# Patient Record
Sex: FEMALE | Race: WHITE | NOT HISPANIC OR LATINO | URBAN - METROPOLITAN AREA
[De-identification: names, ages, dates, MRNs, and addresses within clinical notes are randomized per-mention and may not be internally consistent; named-entity substitution may affect disease eponyms.]

---

## 2017-09-26 ENCOUNTER — APPOINTMENT (RX ONLY)
Dept: URBAN - METROPOLITAN AREA CLINIC 23 | Facility: CLINIC | Age: 22
Setting detail: DERMATOLOGY
End: 2017-09-26

## 2017-09-26 DIAGNOSIS — D22 MELANOCYTIC NEVI: ICD-10-CM

## 2017-09-26 DIAGNOSIS — L70.0 ACNE VULGARIS: ICD-10-CM

## 2017-09-26 PROBLEM — D22.62 MELANOCYTIC NEVI OF LEFT UPPER LIMB, INCLUDING SHOULDER: Status: ACTIVE | Noted: 2017-09-26

## 2017-09-26 PROBLEM — J30.1 ALLERGIC RHINITIS DUE TO POLLEN: Status: ACTIVE | Noted: 2017-09-26

## 2017-09-26 PROCEDURE — 99202 OFFICE O/P NEW SF 15 MIN: CPT

## 2017-09-26 PROCEDURE — ? OBSERVATION AND MEASURE

## 2017-09-26 PROCEDURE — ? PRESCRIPTION

## 2017-09-26 PROCEDURE — ? COUNSELING

## 2017-09-26 PROCEDURE — ? TREATMENT REGIMEN

## 2017-09-26 RX ORDER — DOXYCYCLINE 75 MG/1
CAPSULE ORAL
Qty: 30 | Refills: 2 | Status: ERX | COMMUNITY
Start: 2017-09-26

## 2017-09-26 RX ORDER — ADAPALENE 3 MG/G
GEL TOPICAL
Qty: 1 | Refills: 2 | Status: ERX | COMMUNITY
Start: 2017-09-26

## 2017-09-26 RX ADMIN — ADAPALENE: 3 GEL TOPICAL at 15:14

## 2017-09-26 RX ADMIN — DOXYCYCLINE: 75 CAPSULE ORAL at 15:11

## 2017-09-26 ASSESSMENT — LOCATION DETAILED DESCRIPTION DERM
LOCATION DETAILED: LEFT ANTERIOR AXILLA
LOCATION DETAILED: LEFT INFERIOR CENTRAL MALAR CHEEK
LOCATION DETAILED: RIGHT CENTRAL MALAR CHEEK

## 2017-09-26 ASSESSMENT — LOCATION SIMPLE DESCRIPTION DERM
LOCATION SIMPLE: LEFT ANTERIOR AXILLA
LOCATION SIMPLE: RIGHT CHEEK
LOCATION SIMPLE: LEFT CHEEK

## 2017-09-26 ASSESSMENT — LOCATION ZONE DERM
LOCATION ZONE: AXILLAE
LOCATION ZONE: FACE

## 2017-09-26 NOTE — PROCEDURE: TREATMENT REGIMEN
Samples Given: [employer] coupon
Detail Level: Zone
Otc Regimen: Wash with CeraVe and moisturize with CeraVe lotion

## 2023-04-20 ENCOUNTER — HOSPITAL ENCOUNTER (OUTPATIENT)
Dept: DATA CONVERSION | Facility: HOSPITAL | Age: 28
Discharge: HOME | End: 2023-04-20
Attending: EMERGENCY MEDICINE

## 2023-04-20 DIAGNOSIS — Z87.442 PERSONAL HISTORY OF URINARY CALCULI: ICD-10-CM

## 2023-04-20 DIAGNOSIS — R10.9 UNSPECIFIED ABDOMINAL PAIN: Primary | ICD-10-CM

## 2023-04-20 DIAGNOSIS — Z88.2 ALLERGY STATUS TO SULFONAMIDES: ICD-10-CM

## 2023-04-20 LAB
ALBUMIN SERPL-MCNC: 4.8 GM/DL (ref 3.5–5)
ALBUMIN/GLOB SERPL: 1.6 RATIO (ref 1.5–3)
ALP BLD-CCNC: 66 U/L (ref 35–125)
ALT SERPL-CCNC: 21 U/L (ref 5–40)
ANION GAP SERPL CALCULATED.3IONS-SCNC: 11 MMOL/L (ref 0–19)
AST SERPL-CCNC: 19 U/L (ref 5–40)
BACTERIA UR QL AUTO: NEGATIVE
BASOPHILS # BLD AUTO: 0.07 K/UL (ref 0–0.22)
BASOPHILS NFR BLD AUTO: 0.4 % (ref 0–1)
BILIRUB SERPL-MCNC: 0.7 MG/DL (ref 0.1–1.2)
BILIRUB UR QL STRIP.AUTO: NEGATIVE
BUN SERPL-MCNC: 15 MG/DL (ref 8–25)
BUN/CREAT SERPL: 16.7 RATIO (ref 8–21)
CALCIUM SERPL-MCNC: 9.7 MG/DL (ref 8.5–10.4)
CHLORIDE SERPL-SCNC: 96 MMOL/L (ref 97–107)
CLARITY UR: ABNORMAL
CO2 SERPL-SCNC: 23 MMOL/L (ref 24–31)
COLOR UR: YELLOW
CREAT SERPL-MCNC: 0.9 MG/DL (ref 0.4–1.6)
DEPRECATED RDW RBC AUTO: 38.6 FL (ref 37–54)
DIFFERENTIAL METHOD BLD: ABNORMAL
EOSINOPHIL # BLD AUTO: 0.11 K/UL (ref 0–0.45)
EOSINOPHIL NFR BLD: 0.6 % (ref 0–3)
ERYTHROCYTE [DISTWIDTH] IN BLOOD BY AUTOMATED COUNT: 12.5 % (ref 11.7–15)
GFR SERPL CREATININE-BSD FRML MDRD: 89 ML/MIN/1.73 M2
GLOBULIN SER-MCNC: 3 G/DL (ref 1.9–3.7)
GLUCOSE SERPL-MCNC: 127 MG/DL (ref 65–99)
GLUCOSE UR STRIP.AUTO-MCNC: NEGATIVE MG/DL
HCG UR QL: NEGATIVE
HCT VFR BLD AUTO: 41.5 % (ref 36–44)
HGB BLD-MCNC: 14.3 GM/DL (ref 12–15)
HGB UR QL STRIP.AUTO: 1445 /HPF (ref 0–3)
HGB UR QL: ABNORMAL
HYALINE CASTS UR QL AUTO: 6 /LPF
IMM GRANULOCYTES # BLD AUTO: 0.18 K/UL (ref 0–0.1)
KETONES UR QL STRIP.AUTO: ABNORMAL
LEUKOCYTE ESTERASE UR QL STRIP.AUTO: NEGATIVE
LIPASE SERPL-CCNC: 19 U/L (ref 16–63)
LYMPHOCYTES # BLD AUTO: 1.66 K/UL (ref 1.2–3.2)
LYMPHOCYTES NFR BLD MANUAL: 9 % (ref 20–40)
MCH RBC QN AUTO: 29.4 PG (ref 26–34)
MCHC RBC AUTO-ENTMCNC: 34.5 % (ref 31–37)
MCV RBC AUTO: 85.2 FL (ref 80–100)
MICROSCOPIC (UA): ABNORMAL
MONOCYTES # BLD AUTO: 0.52 K/UL (ref 0–0.8)
MONOCYTES NFR BLD MANUAL: 2.8 % (ref 0–8)
NEUTROPHILS # BLD AUTO: 15.97 K/UL
NEUTROPHILS # BLD AUTO: 15.97 K/UL (ref 1.8–7.7)
NEUTROPHILS.IMMATURE NFR BLD: 1 % (ref 0–1)
NEUTS SEG NFR BLD: 86.2 % (ref 50–70)
NITRITE UR QL STRIP.AUTO: NEGATIVE
NRBC BLD-RTO: 0 /100 WBC
PH UR STRIP.AUTO: 6 [PH] (ref 4.6–8)
PLATELET # BLD AUTO: 304 K/UL (ref 150–450)
PMV BLD AUTO: 8.5 CU (ref 7–12.6)
POTASSIUM SERPL-SCNC: 3.9 MMOL/L (ref 3.4–5.1)
PROT SERPL-MCNC: 7.8 G/DL (ref 5.9–7.9)
PROT UR STRIP.AUTO-MCNC: 100 MG/DL
RBC # BLD AUTO: 4.87 M/UL (ref 4–4.9)
SODIUM SERPL-SCNC: 130 MMOL/L (ref 133–145)
SP GR UR STRIP.AUTO: 1.03 (ref 1–1.03)
SQUAMOUS UR QL AUTO: ABNORMAL /HPF
URINE CULTURE: ABNORMAL
UROBILINOGEN UR QL STRIP.AUTO: 2 MG/DL (ref 0–1)
WBC # BLD AUTO: 18.5 K/UL (ref 4.5–11)
WBC #/AREA URNS AUTO: 5 /HPF (ref 0–3)

## 2024-02-20 ENCOUNTER — OFFICE VISIT (OUTPATIENT)
Dept: PRIMARY CARE | Facility: CLINIC | Age: 29
End: 2024-02-20
Payer: COMMERCIAL

## 2024-02-20 VITALS
HEART RATE: 88 BPM | SYSTOLIC BLOOD PRESSURE: 110 MMHG | OXYGEN SATURATION: 99 % | HEIGHT: 62 IN | TEMPERATURE: 97.3 F | WEIGHT: 185 LBS | BODY MASS INDEX: 34.04 KG/M2 | DIASTOLIC BLOOD PRESSURE: 70 MMHG

## 2024-02-20 DIAGNOSIS — J02.9 PHARYNGITIS, UNSPECIFIED ETIOLOGY: Primary | ICD-10-CM

## 2024-02-20 PROBLEM — L73.2 HIDRADENITIS SUPPURATIVA: Status: ACTIVE | Noted: 2024-02-20

## 2024-02-20 PROBLEM — F50.819 BINGE EATING DISORDER: Status: ACTIVE | Noted: 2023-02-21

## 2024-02-20 PROBLEM — F41.9 ANXIETY: Status: ACTIVE | Noted: 2024-02-20

## 2024-02-20 PROBLEM — F31.81 BIPOLAR II DISORDER (MULTI): Chronic | Status: ACTIVE | Noted: 2024-01-11

## 2024-02-20 PROBLEM — N20.0 KIDNEY STONES: Status: ACTIVE | Noted: 2024-02-20

## 2024-02-20 PROBLEM — L03.90 CELLULITIS: Status: ACTIVE | Noted: 2024-02-20

## 2024-02-20 PROBLEM — L70.9 ACNE: Status: ACTIVE | Noted: 2024-02-20

## 2024-02-20 PROBLEM — N92.1 METRORRHAGIA: Status: ACTIVE | Noted: 2024-02-20

## 2024-02-20 PROBLEM — E78.5 HYPERLIPIDEMIA: Status: ACTIVE | Noted: 2023-03-27

## 2024-02-20 PROBLEM — E78.1 PURE HYPERGLYCERIDEMIA: Status: ACTIVE | Noted: 2024-02-20

## 2024-02-20 PROBLEM — J20.9 ACUTE BRONCHITIS: Status: ACTIVE | Noted: 2024-02-20

## 2024-02-20 PROBLEM — F50.81 BINGE EATING DISORDER: Status: ACTIVE | Noted: 2023-02-21

## 2024-02-20 PROBLEM — R73.01 IMPAIRED FASTING GLUCOSE: Status: ACTIVE | Noted: 2024-02-20

## 2024-02-20 PROBLEM — F41.1 GAD (GENERALIZED ANXIETY DISORDER): Status: ACTIVE | Noted: 2022-07-22

## 2024-02-20 PROBLEM — R10.9 FLANK PAIN: Status: ACTIVE | Noted: 2024-02-20

## 2024-02-20 PROBLEM — N92.6 MENSTRUAL DISORDER: Status: ACTIVE | Noted: 2024-02-20

## 2024-02-20 PROBLEM — J06.9 ACUTE URI: Status: ACTIVE | Noted: 2024-02-20

## 2024-02-20 PROBLEM — F34.0 CYCLOTHYMIA: Status: ACTIVE | Noted: 2024-02-20

## 2024-02-20 PROBLEM — E66.9 OBESITY: Status: ACTIVE | Noted: 2024-02-20

## 2024-02-20 PROBLEM — F90.9 ATTENTION DEFICIT HYPERACTIVITY DISORDER: Status: ACTIVE | Noted: 2024-02-20

## 2024-02-20 PROBLEM — K58.1 IRRITABLE BOWEL SYNDROME WITH CONSTIPATION: Status: ACTIVE | Noted: 2023-03-27

## 2024-02-20 LAB — POC RAPID STREP: NEGATIVE

## 2024-02-20 PROCEDURE — 1036F TOBACCO NON-USER: CPT | Performed by: LICENSED PRACTICAL NURSE

## 2024-02-20 PROCEDURE — 87081 CULTURE SCREEN ONLY: CPT | Mod: WESLAB | Performed by: LICENSED PRACTICAL NURSE

## 2024-02-20 PROCEDURE — 99213 OFFICE O/P EST LOW 20 MIN: CPT | Performed by: LICENSED PRACTICAL NURSE

## 2024-02-20 PROCEDURE — 87880 STREP A ASSAY W/OPTIC: CPT | Performed by: LICENSED PRACTICAL NURSE

## 2024-02-20 RX ORDER — DEXTROAMPHETAMINE SACCHARATE, AMPHETAMINE ASPARTATE MONOHYDRATE, DEXTROAMPHETAMINE SULFATE AND AMPHETAMINE SULFATE 7.5; 7.5; 7.5; 7.5 MG/1; MG/1; MG/1; MG/1
30 CAPSULE, EXTENDED RELEASE ORAL DAILY
COMMUNITY
Start: 2024-02-12

## 2024-02-20 RX ORDER — SPIRONOLACTONE 100 MG/1
100 TABLET, FILM COATED ORAL 2 TIMES DAILY
COMMUNITY

## 2024-02-20 RX ORDER — DOXYCYCLINE HYCLATE 50 MG/1
1 CAPSULE ORAL EVERY OTHER DAY
COMMUNITY
Start: 2022-06-29

## 2024-02-20 RX ORDER — METFORMIN HYDROCHLORIDE 500 MG/1
500 TABLET ORAL EVERY 24 HOURS
COMMUNITY

## 2024-02-20 RX ORDER — LAMOTRIGINE 200 MG/1
200 TABLET ORAL DAILY
COMMUNITY

## 2024-02-20 RX ORDER — LISDEXAMFETAMINE DIMESYLATE 30 MG/1
30 CAPSULE ORAL DAILY
COMMUNITY

## 2024-02-20 ASSESSMENT — ENCOUNTER SYMPTOMS
LOSS OF SENSATION IN FEET: 0
FEVER: 0
DEPRESSION: 0
CHILLS: 0
TROUBLE SWALLOWING: 0
COUGH: 1
OCCASIONAL FEELINGS OF UNSTEADINESS: 0
SORE THROAT: 1

## 2024-02-20 ASSESSMENT — PATIENT HEALTH QUESTIONNAIRE - PHQ9
1. LITTLE INTEREST OR PLEASURE IN DOING THINGS: NOT AT ALL
SUM OF ALL RESPONSES TO PHQ9 QUESTIONS 1 AND 2: 0
2. FEELING DOWN, DEPRESSED OR HOPELESS: NOT AT ALL

## 2024-02-20 ASSESSMENT — PAIN SCALES - GENERAL: PAINLEVEL: 0-NO PAIN

## 2024-02-20 NOTE — PATIENT INSTRUCTIONS
It was nice meeting you today Ms. Guadarrama. I'm sorry you've been experiencing these symptoms. Your Rapid Strep test was negative.  You can treat your sore throat with over-the-counter pain relievers. You also can do salt water gargles with 1/2 teaspoon of salt for a in a cup of warm water 2-3 times/day. Throat lozenges can be comforting as well for your throat. If your sore throat persist past 7 days please give our office a call.

## 2024-02-20 NOTE — LETTER
February 20, 2024     Patient: Cherelle Guadarrama   YOB: 1995   Date of Visit: 2/20/2024       To Whom It May Concern:    Cherelle Guadarrama was seen in my clinic on 2/20/2024 at 9:30 am. Please excuse Cherelle for her absence from work on this day to make the appointment.    If you have any questions or concerns, please don't hesitate to call.         Sincerely,         Gerson Arredondo, [place]-CNP        CC: No Recipients

## 2024-02-20 NOTE — PROGRESS NOTES
[facility]: [facility]  PROGRESS NOTE      Cherelle Guadarrama is a 28 y.o. female that is presenting today for Follow-up.      Subjective   Pt presents to the office today for concerns of sore throat. She reports feeling a scratchy throat yesterday and woke up this morning with throat pain, cough and bilateral ear pressure. She took a home covid test that was negative. 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       Review of Systems   Constitutional:  Negative for chills and fever.   HENT:  Positive for sore throat. Negative for trouble swallowing.    Respiratory:  Positive for cough.       Objective   Vitals:    02/20/24 0945   BP: 110/70   Pulse: 88   Temp: 36.3 °C (97.3 °F)   SpO2: 99%      Body mass index is 33.84 kg/m².  Physical Exam  Constitutional:       General: She is not in acute distress.     Appearance: Normal appearance. She is not ill-appearing, toxic-appearing or diaphoretic.   HENT:      Right Ear: Hearing, tympanic membrane, ear canal and external ear normal.      Left Ear: Hearing, tympanic membrane, ear canal and external ear normal.      Mouth/Throat:      Pharynx: Oropharynx is clear. Uvula midline. No pharyngeal swelling, oropharyngeal exudate, posterior oropharyngeal erythema or uvula swelling.      Tonsils: No tonsillar exudate or tonsillar abscesses.   Cardiovascular:      Rate and Rhythm: Normal rate and regular rhythm.   Pulmonary:      Effort: Pulmonary effort is normal. No respiratory distress.      Breath sounds: No wheezing or rales.   Abdominal:      General: Bowel sounds are normal. There is no distension.      Palpations: Abdomen is soft.      Tenderness: There is no abdominal tenderness. There is no guarding.   Musculoskeletal:      Cervical back: No rigidity.   Lymphadenopathy:      Cervical: Cervical adenopathy present.      Right cervical: Posterior cervical adenopathy present.       Diagnostic Results  "  Lab Results   Component Value Date    GLUCOSE 127 (H) 04/20/2023    CALCIUM 9.7 04/20/2023     (L) 04/20/2023    K 3.9 04/20/2023    CO2 23 (L) 04/20/2023    CL 96 (L) 04/20/2023    BUN 15 04/20/2023    CREATININE 0.9 04/20/2023     Lab Results   Component Value Date    ALT 21 04/20/2023    AST 19 04/20/2023    ALKPHOS 66 04/20/2023    BILITOT 0.7 04/20/2023     Lab Results   Component Value Date    WBC 18.5 (H) 04/20/2023    HGB 14.3 04/20/2023    HCT 41.5 04/20/2023    MCV 85.2 04/20/2023     04/20/2023     Lab Results   Component Value Date    CHOL 211 (H) 03/03/2023    CHOL 211 (H) 06/27/2022    CHOL 220 (H) 03/01/2022     Lab Results   Component Value Date    HDL 57 03/03/2023    HDL 51 06/27/2022    HDL 51 03/01/2022     Lab Results   Component Value Date    LDLCALC 122 03/03/2023    LDLCALC 120 06/27/2022    LDLCALC 144 (H) 03/01/2022     Lab Results   Component Value Date    TRIG 159 (H) 03/03/2023    TRIG 200 (H) 06/27/2022    TRIG 127 03/01/2022     No components found for: \"CHOLHDL\"  Lab Results   Component Value Date    HGBA1C 5.8 03/03/2023     Other labs not included in the list above were reviewed either before or during this encounter.    History    Past Medical History:   Diagnosis Date    Other conditions influencing health status     Lactase deficiency     Past Surgical History:   Procedure Laterality Date    TONSILLECTOMY  06/24/2013    Tonsillectomy With Adenoidectomy     No family history on file.  Social History     Socioeconomic History    Marital status: Single     Spouse name: Not on file    Number of children: Not on file    Years of education: Not on file    Highest education level: Not on file   Occupational History    Not on file   Tobacco Use    Smoking status: Never    Smokeless tobacco: Never   Vaping Use    Vaping Use: Never used   Substance and Sexual Activity    Alcohol use: Never    Drug use: Never    Sexual activity: Not on file   Other Topics Concern    Not on " file   Social History Narrative    Not on file     Social Determinants of Health     Financial Resource Strain: Not on file   Food Insecurity: Not on file   Transportation Needs: Not on file   Physical Activity: Not on file   Stress: Not on file   Social Connections: Not on file   Intimate Partner Violence: Not on file   Housing Stability: Not on file     Allergies   Allergen Reactions    Iodinated Contrast Media Hives    Sulfamethoxazole-Trimethoprim Hives    Sulfa (Sulfonamide Antibiotics) Rash     Current Outpatient Medications on File Prior to Visit   Medication Sig Dispense Refill    amphetamine-dextroamphetamine XR (Adderall XR) 30 mg 24 hr capsule Take 1 capsule (30 mg) by mouth once daily. Only takes it when pharmacy does not have vyvanse      doxycycline (Vibramycin) 50 mg capsule Take 1 capsule (50 mg) by mouth every other day.      lamoTRIgine (LaMICtal) 200 mg tablet Take 1 tablet (200 mg) by mouth once daily.      metFORMIN (Glucophage) 500 mg tablet Take 1 tablet (500 mg) by mouth once every 24 hours.      spironolactone (Aldactone) 100 mg tablet Take 1 tablet (100 mg) by mouth 2 times a day.      Vyvanse 30 mg capsule Take 1 capsule (30 mg) by mouth once daily.       No current facility-administered medications on file prior to visit.     Immunization History   Administered Date(s) Administered    DTP 1995, 1995, 10/19/1995    DTaP / HiB / IPV 1995, 1994    DTaP vaccine, pediatric  (INFANRIX) 1995, 1994    DTaP, Unspecified 04/15/1999    Flu vaccine (IIV4), preservative free *Check age/dose* 10/16/2017, 11/20/2018, 11/11/2022    Flu vaccine, quadrivalent, no egg protein, age 6 month or greater (FLUCELVAX) 10/18/2020    HPV, Quadrivalent 08/11/2010, 10/20/2010, 02/16/2011    Hepatitis A vaccine, pediatric/adolescent (HAVRIX, VAQTA) 08/11/2010, 02/16/2011    Hepatitis B vaccine, pediatric/adolescent (RECOMBIVAX, ENGERIX) 1995, 1994, 01/25/1995    HiB  PRP-OMP conjugate vaccine, pediatric (PEDVAXHIB) 1994, 1994, 02/25/1995    HiB, unspecified 1994, 07/25/1995    Influenza Whole 11/01/2005, 10/18/2006, 10/24/2007, 11/14/2008, 10/06/2009, 10/15/2011    Influenza, High Dose Seasonal, Preservative Free 10/01/2018    Influenza, injectable, quadrivalent 10/04/2016, 10/05/2016, 01/05/2017, 10/21/2020    Influenza, seasonal, injectable 10/20/2010, 12/05/2012, 10/01/2014    Influenza, seasonal, injectable, preservative free 10/10/2015    MMR vaccine, subcutaneous (MMR II) 1994, 07/25/1995, 04/15/1999    Meningococcal MCV4P 09/10/2005, 07/09/2012    Moderna COVID-19 vaccine, Fall 2023, 12 yeasrs and older (50mcg/0.5mL) 01/05/2024    Moderna COVID-19 vaccine, bivalent, blue cap/gray label *Check age/dose* 11/11/2022    Moderna SARS-CoV-2 Vaccination 04/17/2021, 05/13/2021    Novel influenza-H1N1-09, preservative-free 12/02/2009, 10/20/2010    OPV 1994    Polio, Unspecified 04/15/1999    Poliovirus vaccine, subcutaneous (IPOL) 1994, 1995    Td vaccine, age 7 years and older (TENIVAC) 08/08/2012    Tdap vaccine, age 7 year and older (BOOSTRIX, ADACEL) 03/13/2006, 02/05/2018    Varicella vaccine, subcutaneous (VARIVAX) 04/24/1997, 07/09/2012     Patient's medical history was reviewed and updated either before or during this encounter.       Assessment/Plan   Problem List Items Addressed This Visit    None  Visit Diagnoses       Pharyngitis, unspecified etiology    -  Primary    Relevant Orders    POCT Rapid Strep A manually resulted (Completed)    Group A Streptococcus, Culture            Gerson Arredondo, APRN-CNP

## 2024-02-22 LAB — S PYO THROAT QL CULT: NORMAL

## 2024-02-28 ENCOUNTER — TELEPHONE (OUTPATIENT)
Dept: PRIMARY CARE | Facility: CLINIC | Age: 29
End: 2024-02-28
Payer: COMMERCIAL

## 2024-02-28 DIAGNOSIS — J22 LOWER RESPIRATORY INFECTION (E.G., BRONCHITIS, PNEUMONIA, PNEUMONITIS, PULMONITIS): Primary | ICD-10-CM

## 2024-02-28 RX ORDER — AZITHROMYCIN 250 MG/1
TABLET, FILM COATED ORAL
Qty: 6 TABLET | Refills: 0 | Status: SHIPPED | OUTPATIENT
Start: 2024-02-28 | End: 2024-03-04

## 2024-02-28 NOTE — TELEPHONE ENCOUNTER
I see that she was evaluated by one of my associates the other week and instructed to utilize OTC meds to manage symptoms. Clearly ineffective. Due to the duration and severity of symptoms, Rx for Zpak was sent to [facility].

## 2024-02-28 NOTE — TELEPHONE ENCOUNTER
PATIENT STATES THAT SHE HAS A WET COUGH PRESSURE IN HER EARS COUGHING UP YELLOW AND GREEN MUCUS AND FEELING A LITTLE TIRED PATIENT TOOK A COVID TEST THIS MORNING AND IT WAS NEGATIVE

## 2024-12-04 ENCOUNTER — HOSPITAL ENCOUNTER (EMERGENCY)
Facility: HOSPITAL | Age: 29
Discharge: HOME | End: 2024-12-04
Attending: EMERGENCY MEDICINE
Payer: COMMERCIAL

## 2024-12-04 VITALS
SYSTOLIC BLOOD PRESSURE: 136 MMHG | OXYGEN SATURATION: 96 % | HEIGHT: 63 IN | RESPIRATION RATE: 18 BRPM | WEIGHT: 189.82 LBS | BODY MASS INDEX: 33.63 KG/M2 | DIASTOLIC BLOOD PRESSURE: 69 MMHG | HEART RATE: 84 BPM | TEMPERATURE: 98.4 F

## 2024-12-04 DIAGNOSIS — K29.00 ACUTE GASTRITIS, PRESENCE OF BLEEDING UNSPECIFIED, UNSPECIFIED GASTRITIS TYPE: ICD-10-CM

## 2024-12-04 DIAGNOSIS — R11.2 NAUSEA AND VOMITING, UNSPECIFIED VOMITING TYPE: ICD-10-CM

## 2024-12-04 DIAGNOSIS — J06.9 UPPER RESPIRATORY TRACT INFECTION, UNSPECIFIED TYPE: Primary | ICD-10-CM

## 2024-12-04 PROCEDURE — 99281 EMR DPT VST MAYX REQ PHY/QHP: CPT | Performed by: EMERGENCY MEDICINE

## 2024-12-04 PROCEDURE — 99283 EMERGENCY DEPT VISIT LOW MDM: CPT | Performed by: EMERGENCY MEDICINE

## 2024-12-04 RX ORDER — ONDANSETRON 4 MG/1
4 TABLET, ORALLY DISINTEGRATING ORAL EVERY 8 HOURS PRN
Qty: 30 TABLET | Refills: 0 | Status: SHIPPED | OUTPATIENT
Start: 2024-12-04

## 2024-12-04 RX ORDER — PSEUDOEPHEDRINE HCL 30 MG
30 TABLET ORAL EVERY 6 HOURS PRN
Qty: 16 TABLET | Refills: 0 | Status: SHIPPED | OUTPATIENT
Start: 2024-12-04 | End: 2024-12-11

## 2024-12-04 RX ORDER — FAMOTIDINE 20 MG/1
20 TABLET, FILM COATED ORAL DAILY
Qty: 30 TABLET | Refills: 0 | Status: SHIPPED | OUTPATIENT
Start: 2024-12-04 | End: 2025-01-03

## 2024-12-04 ASSESSMENT — COLUMBIA-SUICIDE SEVERITY RATING SCALE - C-SSRS
1. IN THE PAST MONTH, HAVE YOU WISHED YOU WERE DEAD OR WISHED YOU COULD GO TO SLEEP AND NOT WAKE UP?: NO
6. HAVE YOU EVER DONE ANYTHING, STARTED TO DO ANYTHING, OR PREPARED TO DO ANYTHING TO END YOUR LIFE?: NO
2. HAVE YOU ACTUALLY HAD ANY THOUGHTS OF KILLING YOURSELF?: NO

## 2024-12-04 NOTE — ED PROVIDER NOTES
HPI   Chief Complaint   Patient presents with    Flu Symptoms     For the past 3 days I have had fatigue sinus drainage vomiting and sore throat        29-year-old female presents for evaluation of URI symptoms, nausea and vomiting.  For the past 3 days she has been having increased malaise and fatigue, sinus congestion with posterior nasal drainage, mild sore throat.  She did have vomiting on Monday, 2 days ago that seem to resolve in the absence of abdominal pain as well as small amount of diarrhea.  Thought this was better and then today she did have vomiting again this morning.  Noted a few red flecks and was concerned prompting her to seek treatment.  No fever.  No abdominal pain.  She is on semaglutide which she last took on Sunday and wondered if this could be causing her vomiting.      History provided by:  Patient and medical records          Patient History   Past Medical History:   Diagnosis Date    Other conditions influencing health status     Lactase deficiency     Past Surgical History:   Procedure Laterality Date    TONSILLECTOMY  06/24/2013    Tonsillectomy With Adenoidectomy     No family history on file.  Social History     Tobacco Use    Smoking status: Never    Smokeless tobacco: Never   Vaping Use    Vaping status: Never Used   Substance Use Topics    Alcohol use: Never    Drug use: Never       Physical Exam   ED Triage Vitals [12/04/24 1134]   Temperature Heart Rate Respirations BP   36.9 °C (98.4 °F) (!) 105 18 136/69      Pulse Ox Temp Source Heart Rate Source Patient Position   96 % Temporal Monitor Sitting      BP Location FiO2 (%)     Left arm --       Physical Exam  Vitals and nursing note reviewed.     General: Vitals reviewed. Awake, alert, well-developed, well-nourished, NAD  HEENT: NC/AT, PERRL, MMM, negative airway, significant nasal congestion present with boggy nasal turbinates  Neck: Supple, trachea midline  Respiratory: No respiratory distress, lungs clear to auscultation  bilaterally, no wheezes, rhonchi, or rales  CV: Regular rate and regular rhythm, no murmur/gallop/rubs  Abdomen/GI: Soft, non-tender, non-distended, no rebound, guarding, or rigidity, normal bowel sounds  Extremities: Moving all extremities, no deformities  Neuro: A/O, normal speech  Skin: Warm, dry. No rashes identified      ED Course & MDM   Diagnoses as of 12/04/24 1205   Upper respiratory tract infection, unspecified type   Nausea and vomiting, unspecified vomiting type   Acute gastritis, presence of bleeding unspecified, unspecified gastritis type                 No data recorded     [place] Coma Scale Score: 15 (12/04/24 1131 : Marky Duong, EMT)                           Medical Decision Making  29-year-old female presents for cough, congestion, malaise and fatigue, vomiting and diarrhea.  Currently she is not having any abdominal pain or nausea but was concerned that there may be a few small red flecks in her vomitus today.  Her abdominal exam is benign and while gastritis may be component may be viral in etiology.  Could also be related to her semaglutide.  She is asymptomatic at this time and although labs were considered do not feel they would  therefore not indicated at this time.  Clinically symptoms are most consistent with viral syndrome.  I have considered alternative/bacterial etiologies such as otitis media, streptococcal pharyngitis, meningitis/encephalitis, pneumonia, sepsis, urinary tract infection/pyelonephritis among others however based on history and physical exam these have been ruled out and do not feel further testing is indicated. Exam is overall benign and patient is well appearing, afebrile, and hemodynamically stable. Advised supportive care measures and appropriate for outpatient management.  Will prescribe Sudafed, Pepcid, Zofran.  Return precautions discussed.  Discussed that more than 50% of abdominal pain that comes to the Emergency Department goes undiagnosed and  that there were no emergent findings in workup today. Discussed that certain diagnoese such as appendicitis, colitis, diverticulitis, cholelithiasis or other illnesses are undetectable early on in their course and may not be seen on the first visit.  I recommended abdominal re-examination in 12-24 hours if  symptoms are not significantly improved, sooner if worsening.       Amount and/or Complexity of Data Reviewed  External Data Reviewed: notes.     Details: 7/18/2024 outpatient primary care note reviewed        Procedure  Procedures     Kaitlyn Guzman MD  12/04/24 9350

## 2024-12-04 NOTE — Clinical Note
Cherelle Guadarrama was seen and treated in our emergency department on 12/4/2024.  She may return to work on 12/05/2024.       If you have any questions or concerns, please don't hesitate to call.      Kaitlyn Guzman MD
98.2

## 2025-01-02 ENCOUNTER — LAB (OUTPATIENT)
Dept: LAB | Facility: LAB | Age: 30
End: 2025-01-02
Payer: COMMERCIAL

## 2025-01-02 ENCOUNTER — OFFICE VISIT (OUTPATIENT)
Dept: OBSTETRICS AND GYNECOLOGY | Facility: CLINIC | Age: 30
End: 2025-01-02
Payer: COMMERCIAL

## 2025-01-02 VITALS
HEIGHT: 63 IN | BODY MASS INDEX: 34.2 KG/M2 | WEIGHT: 193 LBS | DIASTOLIC BLOOD PRESSURE: 76 MMHG | SYSTOLIC BLOOD PRESSURE: 115 MMHG

## 2025-01-02 DIAGNOSIS — Z30.011 INITIATION OF ORAL CONTRACEPTION: ICD-10-CM

## 2025-01-02 DIAGNOSIS — Z12.4 CERVICAL CANCER SCREENING: ICD-10-CM

## 2025-01-02 DIAGNOSIS — Z13.29 SCREENING FOR ENDOCRINE DISORDER: ICD-10-CM

## 2025-01-02 DIAGNOSIS — N92.0 MENORRHAGIA WITH REGULAR CYCLE: ICD-10-CM

## 2025-01-02 DIAGNOSIS — Z83.3 FAMILY HISTORY OF DIABETES MELLITUS: ICD-10-CM

## 2025-01-02 DIAGNOSIS — Z11.51 ENCOUNTER FOR SCREENING FOR HUMAN PAPILLOMAVIRUS (HPV): ICD-10-CM

## 2025-01-02 DIAGNOSIS — L70.8 OTHER ACNE: ICD-10-CM

## 2025-01-02 DIAGNOSIS — Z01.419 WELL WOMAN EXAM: Primary | ICD-10-CM

## 2025-01-02 LAB
25(OH)D3 SERPL-MCNC: 17 NG/ML (ref 30–100)
ANION GAP SERPL CALCULATED.3IONS-SCNC: 9 MMOL/L (ref 10–20)
BUN SERPL-MCNC: 10 MG/DL (ref 6–23)
CALCIUM SERPL-MCNC: 9.5 MG/DL (ref 8.6–10.3)
CHLORIDE SERPL-SCNC: 104 MMOL/L (ref 98–107)
CO2 SERPL-SCNC: 27 MMOL/L (ref 21–32)
CREAT SERPL-MCNC: 0.75 MG/DL (ref 0.5–1.05)
DHEA-S SERPL-MCNC: 187 UG/DL (ref 65–395)
EGFRCR SERPLBLD CKD-EPI 2021: >90 ML/MIN/1.73M*2
EST. AVERAGE GLUCOSE BLD GHB EST-MCNC: 88 MG/DL
FSH SERPL-ACNC: 2.9 IU/L
GLUCOSE SERPL-MCNC: 92 MG/DL (ref 74–99)
HBA1C MFR BLD: 4.7 %
LH SERPL-ACNC: 6.8 IU/L
POTASSIUM SERPL-SCNC: 4.1 MMOL/L (ref 3.5–5.3)
PROLACTIN SERPL-MCNC: 12.6 UG/L (ref 3–20)
SODIUM SERPL-SCNC: 136 MMOL/L (ref 136–145)

## 2025-01-02 PROCEDURE — 99395 PREV VISIT EST AGE 18-39: CPT | Performed by: OBSTETRICS & GYNECOLOGY

## 2025-01-02 PROCEDURE — 83036 HEMOGLOBIN GLYCOSYLATED A1C: CPT

## 2025-01-02 PROCEDURE — 3008F BODY MASS INDEX DOCD: CPT | Performed by: OBSTETRICS & GYNECOLOGY

## 2025-01-02 PROCEDURE — 82627 DEHYDROEPIANDROSTERONE: CPT

## 2025-01-02 PROCEDURE — 83001 ASSAY OF GONADOTROPIN (FSH): CPT

## 2025-01-02 PROCEDURE — 82626 DEHYDROEPIANDROSTERONE: CPT

## 2025-01-02 PROCEDURE — 83002 ASSAY OF GONADOTROPIN (LH): CPT

## 2025-01-02 PROCEDURE — 84146 ASSAY OF PROLACTIN: CPT

## 2025-01-02 PROCEDURE — 36415 COLL VENOUS BLD VENIPUNCTURE: CPT

## 2025-01-02 PROCEDURE — 84402 ASSAY OF FREE TESTOSTERONE: CPT

## 2025-01-02 PROCEDURE — 80048 BASIC METABOLIC PNL TOTAL CA: CPT

## 2025-01-02 PROCEDURE — 1036F TOBACCO NON-USER: CPT | Performed by: OBSTETRICS & GYNECOLOGY

## 2025-01-02 PROCEDURE — 82306 VITAMIN D 25 HYDROXY: CPT

## 2025-01-02 RX ORDER — LEVONORGESTREL AND ETHINYL ESTRADIOL AND ETHINYL ESTRADIOL 0.15MG(84)
1 KIT ORAL DAILY
Qty: 91 TABLET | Refills: 3 | Status: SHIPPED | OUTPATIENT
Start: 2025-01-02 | End: 2026-01-02

## 2025-01-02 RX ORDER — CARIPRAZINE 1.5 MG/1
1 CAPSULE, GELATIN COATED ORAL
COMMUNITY
Start: 2024-10-10

## 2025-01-02 RX ORDER — BUPROPION HYDROCHLORIDE 300 MG/1
1 TABLET ORAL
COMMUNITY
Start: 2024-11-24

## 2025-01-02 RX ORDER — LISDEXAMFETAMINE DIMESYLATE 40 MG/1
1 CAPSULE ORAL
COMMUNITY
Start: 2024-12-04

## 2025-01-02 ASSESSMENT — SOCIAL DETERMINANTS OF HEALTH (SDOH)
WITHIN THE LAST YEAR, HAVE YOU BEEN HUMILIATED OR EMOTIONALLY ABUSED IN OTHER WAYS BY YOUR PARTNER OR EX-PARTNER?: NO
WITHIN THE LAST YEAR, HAVE YOU BEEN AFRAID OF YOUR PARTNER OR EX-PARTNER?: NO
WITHIN THE LAST YEAR, HAVE TO BEEN RAPED OR FORCED TO HAVE ANY KIND OF SEXUAL ACTIVITY BY YOUR PARTNER OR EX-PARTNER?: NO
WITHIN THE LAST YEAR, HAVE YOU BEEN KICKED, HIT, SLAPPED, OR OTHERWISE PHYSICALLY HURT BY YOUR PARTNER OR EX-PARTNER?: NO

## 2025-01-02 ASSESSMENT — PATIENT HEALTH QUESTIONNAIRE - PHQ9
1. LITTLE INTEREST OR PLEASURE IN DOING THINGS: NOT AT ALL
2. FEELING DOWN, DEPRESSED OR HOPELESS: NOT AT ALL
SUM OF ALL RESPONSES TO PHQ9 QUESTIONS 1 & 2: 0

## 2025-01-02 ASSESSMENT — ENCOUNTER SYMPTOMS
OCCASIONAL FEELINGS OF UNSTEADINESS: 0
LOSS OF SENSATION IN FEET: 0
DEPRESSION: 0

## 2025-01-02 ASSESSMENT — LIFESTYLE VARIABLES
SKIP TO QUESTIONS 9-10: 1
HOW OFTEN DO YOU HAVE A DRINK CONTAINING ALCOHOL: NEVER
HOW OFTEN DO YOU HAVE SIX OR MORE DRINKS ON ONE OCCASION: NEVER
AUDIT-C TOTAL SCORE: 0
HOW MANY STANDARD DRINKS CONTAINING ALCOHOL DO YOU HAVE ON A TYPICAL DAY: PATIENT DOES NOT DRINK

## 2025-01-02 ASSESSMENT — PAIN SCALES - GENERAL: PAINLEVEL_OUTOF10: 0-NO PAIN

## 2025-01-02 NOTE — PROGRESS NOTES
ESTABLISHED ANNUAL GYN VISIT     Patient Name:  Cherelle Guadarrama  :  1995  MR #:  [id number]  [id number]      ASSESSMENT/PLAN:   Diagnoses and all orders for this visit:  Well woman exam  Cervical cancer screening  -     THINPREP PAP TEST  Initiation of oral contraception  -     L norgest/e.estradioL-e.estrad 0.15 mg-20 mcg/ 0.15 mg-25 mcg tablets,dose pack,3 month; Take 1 tablet by mouth once daily.  Menorrhagia with regular cycle  -     L norgest/e.estradioL-e.estrad 0.15 mg-20 mcg/ 0.15 mg-25 mcg tablets,dose pack,3 month; Take 1 tablet by mouth once daily.  Other acne  -     L norgest/e.estradioL-e.estrad 0.15 mg-20 mcg/ 0.15 mg-25 mcg tablets,dose pack,3 month; Take 1 tablet by mouth once daily.  Encounter for screening for human papillomavirus (HPV)  -     THINPREP PAP TEST  Screening for endocrine disorder  -     FSH & LH; Future  -     Testosterone, total and free; Future  -     DHEA; Future  -     DHEA-Sulfate; Future  -     Prolactin; Future  -     Hemoglobin A1C; Future  -     Basic Metabolic Panel; Future  -     Vitamin D 25-Hydroxy,Total (for eval of Vitamin D levels); Future  Family history of diabetes mellitus  -     FSH & LH; Future  -     Testosterone, total and free; Future  -     DHEA; Future  -     DHEA-Sulfate; Future  -     Prolactin; Future  -     Hemoglobin A1C; Future  -     Basic Metabolic Panel; Future      Counseling:  Medication education:  Education:  All new and/or current medications discussed and reviewed including side effects with patient/caregiver, Understanding:  Caregiver/Patient expressed understanding., Adherence:  Barriers to adherence identified and discussed if present,     OB/GYN Preventive:  Pap smear indicated every 5 years if normal and otherwise low risk. Self breast exam monthly and clinical breast examination yearly discussed.  Diet/Weight management discussed.  Screening colonoscopy recommended starting age 45, then Q3-10 years depending on testing  and family history.  Osteoporosis prevention discussion included vit d3/calcium supplements, weight-bearing exercise.  Genitourinary skin hygiene discussed.  Reviewed Gardasil history or recommendation.    Chief Complaint:  Annual exam    HPI:  Cherelle Guadarrama is a 29 y.o.  Patient's last menstrual period was 12/10/2024 (approximate). C female who presents for annual.  Regular periods: Q4 hrs with ultra tampons; Q28 days, 5 days.      GYNH:   MENARCHE:  HPV Vaccine Yes - .  Sexual activity Yes - . [place] Yes - . some patients will have that difficulty with losing weight  Birth control history: none    Past Medical History:   Diagnosis Date    ADHD     Anxiety     Depression     Mood disorder ([facility])     Other conditions influencing health status     Lactase deficiency       Past Surgical History:   Procedure Laterality Date    TONSILLECTOMY  2013    Tonsillectomy With Adenoidectomy       Social History     Tobacco Use    Smoking status: Never    Smokeless tobacco: Never   Vaping Use    Vaping status: Never Used   Substance Use Topics    Alcohol use: Never    Drug use: Never        Family History   Problem Relation Name Age of Onset    Diabetes type II Father      Stroke Maternal Grandfather      Diabetes type II Paternal Grandmother      Lung cancer Paternal Grandfather         OB History          0    Para   0    Term   0       0    AB   0    Living   0         SAB   0    IAB   0    Ectopic   0    Multiple   0    Live Births   0                  Prior to Admission medications    Medication Sig Start Date End Date Taking? Authorizing Provider   buPROPion XL (Wellbutrin XL) 300 mg 24 hr tablet Take 1 tablet (300 mg) by mouth early in the morning.. 24  Yes Historical Provider, MD   lamoTRIgine (LaMICtal) 200 mg tablet Take 1 tablet (200 mg) by mouth once daily.   Yes Historical Provider, MD   lisdexamfetamine (Vyvanse) 40 mg capsule Take 1 capsule (40 mg) by mouth early in the  morning.. 12/4/24  Yes Historical Provider, MD   metFORMIN (Glucophage) 500 mg tablet Take 1 tablet (500 mg) by mouth once every 24 hours.   Yes Historical Provider, MD   Vraylar 1.5 mg capsule Take 1 capsule (1.5 mg) by mouth early in the morning.. 10/10/24  Yes Historical Provider, MD   Vyvanse 30 mg capsule Take 1 capsule (30 mg) by mouth once daily.  1/2/25 Yes Historical Provider, MD   famotidine (Pepcid) 20 mg tablet Take 1 tablet (20 mg) by mouth once daily.  Patient not taking: Reported on 1/2/2025 12/4/24 1/3/25  Kaitlyn Guzman MD   ondansetron ODT (Zofran-ODT) 4 mg disintegrating tablet Dissolve 1 tablet (4 mg) in the mouth every 8 hours if needed for nausea or vomiting.  Patient not taking: Reported on 1/2/2025 12/4/24   Kaitlyn Guzman MD   pseudoephedrine (Sudafed) 30 mg tablet Take 1 tablet (30 mg) by mouth every 6 hours if needed for congestion for up to 7 days. 12/4/24 12/11/24  Kaitlyn Guzman MD   semaglutide (Ozempic) 0.25 mg or 0.5 mg(2 mg/1.5 mL) pen injector Inject 0.25 mg under the skin.    Historical Provider, MD   amphetamine-dextroamphetamine XR (Adderall XR) 30 mg 24 hr capsule Take 1 capsule (30 mg) by mouth once daily. Only takes it when pharmacy does not have vyvanse 2/12/24 1/2/25  Historical Provider, MD   doxycycline (Vibramycin) 50 mg capsule Take 1 capsule (50 mg) by mouth every other day. 6/29/22 1/2/25  Historical Provider, MD   spironolactone (Aldactone) 100 mg tablet Take 1 tablet (100 mg) by mouth 2 times a day.  1/2/25  Historical Provider, MD       Allergies   Allergen Reactions    Iodinated Contrast Media Hives    Sulfamethoxazole-Trimethoprim Hives    Sulfa (Sulfonamide Antibiotics) Rash         ROS:   [facility] - WOMEN ONLY:          Breast Lump No acute changes.  Hot Flashes Occasional.  Painful August no.  Vaginal Discharge no.       [facility] - ROS Update:          Unexplained Weight Change no.  Pain anywhere in your body no.  Black or bloody stools no.  Problems  "with urination no.  Rashes or sores no.  Sexual problems no.  Depression or anxiety problems no.  Do you feel threatened by anyone No.      OBJECTIVE:   /76   Ht 1.6 m (5' 3\")   Wt 87.5 kg (193 lb)   LMP 12/10/2024 (Approximate)   BMI 34.19 kg/m²   Body mass index is 34.19 kg/m².     Physical Exam  GENERAL:   General Appearance:  well-developed, well-nourished, no functional handicap, well-groomed.  Hygiene:  good.  Ill-appearance:  none.  Mental Status:  alert and oriented.  Speech:  clear.  Eye contact:  normal.  Appears stated age:  yes.    LUNGS:  CTAB.  Effort:  no respiratory distress.    HEART:  HRRR with S1/S2 w/o M/C/R  BREASTS: General:  no masses, no tenderness, no skin changes, no nipple abnormality, and no axillary lymphadenopathy.   ABDOMEN: Tenderness:  none.  Distention:  none.   GENITOURINARY - FEMALE: Bladder:  normal.  Pelvic support defects:  not seen .  External genitalia:  Normal.  Urethra:  Normal.  Vagina:  no lesions, moderate discharge; STI screens collected - No.  Cervix/ cuff:  easily bleeds .  Uterus:  normal size/shape/consistency, non tender.  Adnexa:  normal , non tender.   DERMATOLOGY:  Skin: acne  EXTREMITIES: Normal:  no anomalies.  Edema:  none.    NEUROLOGICAL: Orientation:  alert and oriented x 3.   PSYCHOLOGY: Affect:  appropriate.  Mood:  pleasant.    Labs reviewed:  Yes -     Imaging reviewed:  Yes -     Note: This dictation was generated using Dragon voice recognition software. Please excuse any grammatical or spelling errors that may have occurred using the system.        "

## 2025-01-03 DIAGNOSIS — E55.9 VITAMIN D DEFICIENCY: Primary | ICD-10-CM

## 2025-01-03 RX ORDER — ACETAMINOPHEN 500 MG
5000 TABLET ORAL WEEKLY
Qty: 8 TABLET | Refills: 0 | Status: SHIPPED | OUTPATIENT
Start: 2025-01-03 | End: 2025-03-04

## 2025-01-07 LAB — DHEA SERPL-MCNC: 3.56 NG/ML (ref 1.33–7.78)

## 2025-01-09 LAB
TESTOSTERONE FREE (CHAN): 2.3 PG/ML (ref 0.1–6.4)
TESTOSTERONE,TOTAL,LC-MS/MS: 11 NG/DL (ref 2–45)

## 2025-01-15 LAB
CYTOLOGY CMNT CVX/VAG CYTO-IMP: NORMAL
HPV HR 12 DNA GENITAL QL NAA+PROBE: NEGATIVE
HPV HR GENOTYPES PNL CVX NAA+PROBE: NEGATIVE
HPV16 DNA SPEC QL NAA+PROBE: NEGATIVE
HPV18 DNA SPEC QL NAA+PROBE: NEGATIVE
LAB AP HPV GENOTYPE QUESTION: YES
LAB AP HPV HR: NORMAL
LAB AP PREVIOUS ABNORMAL HISTORY: NORMAL
LABORATORY COMMENT REPORT: NORMAL
LMP START DATE: NORMAL
PATH REPORT.TOTAL CANCER: NORMAL

## 2025-02-24 ENCOUNTER — APPOINTMENT (OUTPATIENT)
Dept: PRIMARY CARE | Facility: CLINIC | Age: 30
End: 2025-02-24
Payer: COMMERCIAL

## 2025-07-25 ENCOUNTER — APPOINTMENT (OUTPATIENT)
Dept: OTOLARYNGOLOGY | Facility: CLINIC | Age: 30
End: 2025-07-25
Payer: COMMERCIAL

## 2025-07-25 VITALS — BODY MASS INDEX: 31.54 KG/M2 | WEIGHT: 178 LBS | HEIGHT: 63 IN | TEMPERATURE: 97.6 F

## 2025-07-25 DIAGNOSIS — R43.1 HYPEROSMIA: Primary | ICD-10-CM

## 2025-07-25 DIAGNOSIS — J30.89 SEASONAL AND PERENNIAL ALLERGIC RHINITIS: ICD-10-CM

## 2025-07-25 DIAGNOSIS — J30.2 SEASONAL AND PERENNIAL ALLERGIC RHINITIS: ICD-10-CM

## 2025-07-25 PROCEDURE — 99203 OFFICE O/P NEW LOW 30 MIN: CPT

## 2025-07-25 PROCEDURE — 3008F BODY MASS INDEX DOCD: CPT

## 2025-07-25 PROCEDURE — 31231 NASAL ENDOSCOPY DX: CPT

## 2025-07-25 RX ORDER — AZELASTINE 1 MG/ML
SPRAY, METERED NASAL
Qty: 1 ML | Refills: 11 | Status: SHIPPED | OUTPATIENT
Start: 2025-07-25

## 2025-07-25 NOTE — PROGRESS NOTES
"Chief Complaint   Patient presents with    New Patient Visit     LOV 6/2011 DR. VALDES.  PT. C/O HIGHTENED SENSE OF SMELL THAT IS MAKING HER SICK TO HER STOMACH, SHE IS VOMITING X 6 MONTHS     HPI:  Cherelle Guadarrama is a 30 y.o. female presents with complaints of heightened sense of smell causing gagging and sometimes vomiting x 6 months.  Especially smells like dog food garbage and fish.  Started Ozempic in September and birth control within the past 6 months.    PMH:  Medical History[1]  Surgical History[2]      Medications:   Current Medications[3]     Allergies:  Allergies[4]     ROS:  Review of systems normal unless stated otherwise in the HPI and/or PMH.    Physical Exam:  Temperature 36.4 °C (97.6 °F), height 1.6 m (5' 3\"), weight 80.7 kg (178 lb). Body mass index is 31.53 kg/m².     GENERAL APPEARANCE: Well developed and well nourished.  Alert and oriented in no acute distress.  Normal vocal quality.      HEAD/FACE: No erythema or edema or facial tenderness.  Normal facial nerve function bilaterally.    EAR:       EXTERNAL: Normal pinnas and external auditory canals without lesion or obstructing wax.       MIDDLE EAR: Tympanic membranes intact and mobile with normal landmarks.  Middle ear space appears well aerated.       TUBE STATUS: N/A       MASTOID CAVITY: N/A       HEARING: Gross hearing assessment is within normal limits.      NOSE:       VISUALIZED USING: Anterior rhinoscopy with headlight and nasal speculum.       DORSUM: Midline, nontraumatic appearance.       MUCOSA: Normal-appearing.       SECRETIONS: Normal.       SEPTUM: Midline and nonobstructing.       INFERIOR TURBINATES: Enlarged gray       MIDDLE TURBINATES/MEATUS: Gray enlarged       BLEEDING: N/A       Nasal endoscopy explained received verbal consent. Applied decongestant and topical anesthetic. Demonstrates normal nasal pharynx without obstruction or masses.    ORAL CAVITY/PHARYNX:       TEETH: Adequate dentition.       TONGUE: No mass or " lesion.  Normal mobility.       FLOOR OF MOUTH: No mass or lesion.       PALATE: Normal hard palate, soft palate, and uvula.       OROPHARYNX: Normal without mass or lesion.       BUCCAL MUCOSA/GBS: Normal without mass or lesion.       LIPS: Normal.    LARYNX/HYPOPHARYNX/NASOPHARYNX: N/A    NECK: No palpable masses or abnormal adenopathy.  Trachea is midline.    THYROID: No thyromegaly or palpable nodule.    SALIVARY GLANDS: Normal bilateral parotid and submandibular glands by inspection and palpation.    TMJ's: Normal.    NEURO: Cranial nerve exam grossly normal bilaterally.       Assessment/Plan   Cherelle was seen today for new patient visit.  Diagnoses and all orders for this visit:  Hyperosmia (Primary)  Seasonal and perennial allergic rhinitis  -     azelastine (Astelin) 137 mcg (0.1 %) nasal spray; 2 sprays each nostril twice daily, 1 bottle, 11 refills     I provided reassurances normal nose exam except for pale gray turbinates.  Recommended azelastine.  Cherelle and I discussed possibility of Ozempic or birth control causing hyperosmia.  I recommended he discuss her symptoms with the prescribers.  She did some research on her own which suggested cutting the Ozempic into 2 doses a week could be helpful so she will discuss with prescriber.  I have asked her to return if these interventions do not improve her symptoms.    Follow up if symptoms worsen or fail to improve.   This chart was completed using voice recognition transcription software. Please excuse any errors of transcription including grammatical, punctuation, syntax and spelling errors.  Chaparrita Green, APRN-CNP         [1]   Past Medical History:  Diagnosis Date    ADHD     Anxiety     Depression     Mood disorder     Other conditions influencing health status     Lactase deficiency   [2]   Past Surgical History:  Procedure Laterality Date    TONSILLECTOMY  06/24/2013    Tonsillectomy With Adenoidectomy   [3]   Current Outpatient Medications:      buPROPion XL (Wellbutrin XL) 300 mg 24 hr tablet, Take 1 tablet (300 mg) by mouth early in the morning.., Disp: , Rfl:     L norgest/e.estradioL-e.estrad 0.15 mg-20 mcg/ 0.15 mg-25 mcg tablets,dose pack,3 month, Take 1 tablet by mouth once daily., Disp: 91 tablet, Rfl: 3    lamoTRIgine (LaMICtal) 200 mg tablet, Take 1 tablet (200 mg) by mouth once daily., Disp: , Rfl:     lisdexamfetamine (Vyvanse) 40 mg capsule, Take 1 capsule (40 mg) by mouth early in the morning.., Disp: , Rfl:     semaglutide (Ozempic) 0.25 mg or 0.5 mg(2 mg/1.5 mL) pen injector, Inject 0.25 mg under the skin., Disp: , Rfl:     Vraylar 1.5 mg capsule, Take 1 capsule (1.5 mg) by mouth early in the morning.., Disp: , Rfl:     azelastine (Astelin) 137 mcg (0.1 %) nasal spray, 2 sprays each nostril twice daily, 1 bottle, 11 refills, Disp: 1 mL, Rfl: 11    famotidine (Pepcid) 20 mg tablet, Take 1 tablet (20 mg) by mouth once daily. (Patient not taking: Reported on 1/2/2025), Disp: 30 tablet, Rfl: 0    metFORMIN (Glucophage) 500 mg tablet, Take 1 tablet (500 mg) by mouth once every 24 hours. (Patient not taking: Reported on 7/25/2025), Disp: , Rfl:     ondansetron ODT (Zofran-ODT) 4 mg disintegrating tablet, Dissolve 1 tablet (4 mg) in the mouth every 8 hours if needed for nausea or vomiting. (Patient not taking: Reported on 1/2/2025), Disp: 30 tablet, Rfl: 0    pseudoephedrine (Sudafed) 30 mg tablet, Take 1 tablet (30 mg) by mouth every 6 hours if needed for congestion for up to 7 days., Disp: 16 tablet, Rfl: 0  [4]   Allergies  Allergen Reactions    Iodinated Contrast Media Hives    Sulfamethoxazole-Trimethoprim Hives    Sulfa (Sulfonamide Antibiotics) Rash